# Patient Record
Sex: FEMALE | Race: WHITE | NOT HISPANIC OR LATINO | URBAN - METROPOLITAN AREA
[De-identification: names, ages, dates, MRNs, and addresses within clinical notes are randomized per-mention and may not be internally consistent; named-entity substitution may affect disease eponyms.]

---

## 2017-03-17 ENCOUNTER — COMMUNICATION - HEALTHEAST (OUTPATIENT)
Dept: SCHEDULING | Facility: CLINIC | Age: 47
End: 2017-03-17

## 2017-06-16 ENCOUNTER — OFFICE VISIT - HEALTHEAST (OUTPATIENT)
Dept: FAMILY MEDICINE | Facility: CLINIC | Age: 47
End: 2017-06-16

## 2017-07-27 ENCOUNTER — RECORDS - HEALTHEAST (OUTPATIENT)
Dept: ADMINISTRATIVE | Facility: OTHER | Age: 47
End: 2017-07-27

## 2018-01-10 ENCOUNTER — COMMUNICATION - HEALTHEAST (OUTPATIENT)
Dept: FAMILY MEDICINE | Facility: CLINIC | Age: 48
End: 2018-01-10

## 2018-01-10 DIAGNOSIS — Z12.31 SCREENING MAMMOGRAM, ENCOUNTER FOR: ICD-10-CM

## 2018-09-11 ENCOUNTER — HOSPITAL ENCOUNTER (OUTPATIENT)
Dept: MAMMOGRAPHY | Facility: CLINIC | Age: 48
Discharge: HOME | End: 2018-09-11
Attending: FAMILY MEDICINE

## 2018-09-11 DIAGNOSIS — Z12.31 SCREENING MAMMOGRAM, ENCOUNTER FOR: ICD-10-CM

## 2020-01-17 ENCOUNTER — HOSPITAL ENCOUNTER (OUTPATIENT)
Dept: MAMMOGRAPHY | Facility: CLINIC | Age: 50
Discharge: HOME | End: 2020-01-17
Attending: FAMILY MEDICINE

## 2020-01-17 DIAGNOSIS — Z12.31 VISIT FOR SCREENING MAMMOGRAM: ICD-10-CM

## 2020-02-18 ENCOUNTER — COMMUNICATION - HEALTHEAST (OUTPATIENT)
Dept: HEALTH INFORMATION MANAGEMENT | Facility: CLINIC | Age: 50
End: 2020-02-18

## 2020-03-04 ENCOUNTER — COMMUNICATION - HEALTHEAST (OUTPATIENT)
Dept: SCHEDULING | Facility: CLINIC | Age: 50
End: 2020-03-04

## 2020-03-04 ENCOUNTER — OFFICE VISIT - HEALTHEAST (OUTPATIENT)
Dept: FAMILY MEDICINE | Facility: CLINIC | Age: 50
End: 2020-03-04

## 2020-03-04 DIAGNOSIS — K59.00 CONSTIPATION, UNSPECIFIED CONSTIPATION TYPE: ICD-10-CM

## 2020-03-04 DIAGNOSIS — K62.5 RECTAL BLEEDING: ICD-10-CM

## 2020-03-04 LAB
ERYTHROCYTE [DISTWIDTH] IN BLOOD BY AUTOMATED COUNT: 10.9 % (ref 11–14.5)
HCT VFR BLD AUTO: 38.3 % (ref 35–47)
HGB BLD-MCNC: 13.3 G/DL (ref 12–16)
MCH RBC QN AUTO: 34.1 PG (ref 27–34)
MCHC RBC AUTO-ENTMCNC: 34.7 G/DL (ref 32–36)
MCV RBC AUTO: 98 FL (ref 80–100)
PLATELET # BLD AUTO: 288 THOU/UL (ref 140–440)
PMV BLD AUTO: 7.2 FL (ref 7–10)
RBC # BLD AUTO: 3.91 MILL/UL (ref 3.8–5.4)
T3FREE SERPL-MCNC: 3.1 PG/ML (ref 1.9–3.9)
T4 FREE SERPL-MCNC: 0.9 NG/DL (ref 0.7–1.8)
TSH SERPL DL<=0.005 MIU/L-ACNC: 1.18 UIU/ML (ref 0.3–5)
WBC: 5.5 THOU/UL (ref 4–11)

## 2020-03-05 ENCOUNTER — COMMUNICATION - HEALTHEAST (OUTPATIENT)
Dept: FAMILY MEDICINE | Facility: CLINIC | Age: 50
End: 2020-03-05

## 2020-03-12 ENCOUNTER — RECORDS - HEALTHEAST (OUTPATIENT)
Dept: ADMINISTRATIVE | Facility: OTHER | Age: 50
End: 2020-03-12

## 2021-04-06 ENCOUNTER — AMBULATORY - HEALTHEAST (OUTPATIENT)
Dept: NURSING | Facility: CLINIC | Age: 51
End: 2021-04-06

## 2021-04-27 ENCOUNTER — AMBULATORY - HEALTHEAST (OUTPATIENT)
Dept: NURSING | Facility: CLINIC | Age: 51
End: 2021-04-27

## 2021-05-31 VITALS — WEIGHT: 128 LBS | BODY MASS INDEX: 22.67 KG/M2

## 2021-06-04 VITALS — DIASTOLIC BLOOD PRESSURE: 80 MMHG | HEART RATE: 64 BPM | SYSTOLIC BLOOD PRESSURE: 122 MMHG

## 2021-06-06 NOTE — PROGRESS NOTES
"ASSESSMENT/PLAN:  Rectal bleeding  This is a 49-year-old female with a history of constipation and intermittent rectal bleeding who presented an episode of rectal bleeding with blood clot on the tissue paper noted this afternoon following a bowel movement.  Examination showed a fissure at the 12 o'clock position of the anal canal.  She has a family history of polyps.  I like her to get a colonoscopy.  We will do a CBC.  We spoke about treatment for constipation.  -     HM2(CBC w/o Differential)  -     Ambulatory referral for Colonoscopy    Constipation, unspecified constipation type  Patient describes going weeks without a bowel movement.  She describes hard stool.  She has had fissures and rectal bleeding as a result of constipation.  This is not new and has been chronic.  Will check a thyroid level.  Will recommend Trulance if she continues to be symptomatic.  -     T4, Free  -     T3 (Triiodothyronine), Free  -     Thyroid Stimulating Hormone (TSH)    Orders Placed This Encounter   Procedures     HM2(CBC w/o Differential)     T4, Free     T3 (Triiodothyronine), Free     Thyroid Stimulating Hormone (TSH)     Ambulatory referral for Colonoscopy     Referral Priority:   Routine     Referral Type:   Colonoscopy     Requested Specialty:   Gastroenterology     Number of Visits Requested:   1     CHIEF COMPLAINT:  Chief Complaint   Patient presents with     rectal bleeding     \" blood clot\" not regular BM, has had blood in the stool but never like one today     HISTORY OF PRESENT ILLNESS:  Oly is a 49 y.o. female presenting to the clinic today for abnormal rectal bleeding. She does not have normal bowel movements. She can go a week without having a bowel movement. She never feels pain or pressure from the constipation, but she has been bloated. Whenever she has a bowel movement, the stools are hard. She has had blood in her stool last week. She had pain with that bowel movement. She has had some soiling of her " "underwear with stool a few days ago. This afternoon she had a small \"blood clot\" on her toilet paper after having a bowel movement. The bowel movement was not painful. She saw some blood in the stool as well. She denies any heart burn, abdominal pain or upper GI symptoms. Her energy levels and menstrual cycles have been regular. She denies any rectal itchiness or bleeding outside of bowel movements. She has never had a colonoscopy. Her mother had cancer, but the patient is unsure what kind of cancer. Her mother also had colon polyps. There is no known family history of thyroid disease.     Degree of discomfort:  none  Bleeding:  Intermittent  Prolapse: none  Fecal soiling: none  Itchiness: none  Weight loss: none  Abdominal pain: none  Fever: none  Signs of anemia: none  H/o polyp: none  FMH of colon CA or inflammatory bowel disease: none    REVIEW OF SYSTEMS:   Constitutional: Negative.   HENT: Negative.   Eyes: Negative.   Respiratory: Negative.   Cardiovascular: Negative.   Gastrointestinal: Negative.   Endocrine: Negative.   Genitourinary: Negative.   Musculoskeletal: Negative.   Skin: Negative.   Allergic/Immunologic: Negative.   Neurological: Negative.   Hematological: Negative.   Psychiatric/Behavioral: Negative.   All other systems are negative.    TOBACCO USE:  Social History     Tobacco Use   Smoking Status Never Smoker   Smokeless Tobacco Never Used     VITALS:  Vitals:    03/04/20 1358   BP: 122/80   Patient Site: Left Arm   Patient Position: Sitting   Cuff Size: Adult Regular   Pulse: 64     Wt Readings from Last 3 Encounters:   06/16/17 128 lb (58.1 kg)   10/13/15 122 lb 9.6 oz (55.6 kg)   10/04/15 120 lb (54.4 kg)       PHYSICAL EXAM:  Constitutional: Patient is oriented to person, place, and time. Patient appears well-developed and well-nourished. No distress.   Head: Normocephalic and atraumatic.   Right Ear: External ear normal.   Left Ear: External ear normal.   Nose: Nose normal.   Rectal exam: " There is a fissure at the 12 o'clock position of the anal canal. It is not actively bleeding, but it is open. Good anal sphincter tone.  There is no palpable mass. Stool on examining finger.  No blood on examining finger.   Neurological: Patient is alert and oriented to person, place, and time. Patient has normal reflexes. No cranial nerve deficit. Coordination normal.   Skin: Skin is warm and dry. No rash noted. Patient is not diaphoretic. No erythema. No pallor.    ADDITIONAL HISTORY SUMMARIZED (2): Reviewed RN triage note from 03/04/2020 about rectal bleeding.   DECISION TO OBTAIN EXTRA INFORMATION (1): None.   RADIOLOGY TESTS (1): None.  LABS (1): Reviewed labs from 09/29/15 and ordered labs today.   MEDICINE TESTS (1): None.  INDEPENDENT REVIEW (2 each): None.     Phuong YOUSIF, am scribing for and in the presence of, Dr. Brock.    IDr. Brock, personally performed the services described in this documentation, as scribed by Phuong Hayden in my presence, and it is both accurate and complete.    MEDICATIONS:  Current Outpatient Medications   Medication Sig Dispense Refill     BIOTIN ORAL Take by mouth.       meloxicam (MOBIC) 15 MG tablet Take 0.5-1 tablets (7.5-15 mg total) by mouth daily as needed for pain. 30 tablet 2     MULTIVITAMIN ORAL Take by mouth.       oxyCODONE-acetaminophen (ROXICET) 5-325 mg per tablet Take 1 tablet by mouth every 4 (four) hours as needed for pain. 20 tablet 0     No current facility-administered medications for this visit.        Total data points:3

## 2021-06-06 NOTE — TELEPHONE ENCOUNTER
Test Results  Who is calling?:  Patient  Who ordered the test:  Hanna Farah MD   Type of test: Lab  Date of test:  3/4/2020  Where was the test performed:  [facility]  What are your questions/concerns?:  Patient stated that she has concerns about the result below. Patient stated that she would like a call back.  Okay to leave a detailed message?:  Yes  [phone]         Copied and pasted from Lab result 3/4/2020:  Contains abnormal data HM2(CBC w/o Differential)   Order: [id number]   Status:  Final result   Visible to patient:  Yes (MyChart)   Next appt:  None   Dx:  Rectal bleeding     Ref Range & Units 3/4/20 1428    WBC 4.0 - 11.0 thou/uL 5.5     RBC 3.80 - 5.40 mill/uL 3.91     Hemoglobin 12.0 - 16.0 g/dL 13.3     Hematocrit 35.0 - 47.0 % 38.3     MCV 80 - 100 fL 98     MCH 27.0 - 34.0 pg 34.1High      MCHC 32.0 - 36.0 g/dL 34.7     RDW 11.0 - 14.5 % 10.9Low      Platelets 140 - 440 thou/uL 288     MPV 7.0 - 10.0 fL 7.2

## 2021-06-10 ENCOUNTER — HOSPITAL ENCOUNTER (OUTPATIENT)
Dept: MAMMOGRAPHY | Facility: CLINIC | Age: 51
Discharge: HOME | End: 2021-06-10
Attending: FAMILY MEDICINE

## 2021-06-10 DIAGNOSIS — Z12.31 VISIT FOR SCREENING MAMMOGRAM: ICD-10-CM

## 2021-06-11 NOTE — PROGRESS NOTES
Assessment/Plan:        Diagnoses and all orders for this visit:    Lump    Other orders  -     cephalexin (KEFLEX) 500 MG capsule; Take 1 capsule (500 mg total) by mouth 3 (three) times a day for 7 days.  Dispense: 21 capsule; Refill: 0        Likely related to infected cyst.  Avoid picking in the area.  Easily infected because of the location.  We will start her on Keflex.  Increase yogurt or probiotics with the medication.  Warm compress in the area.  Lesion on her right temporal region has appearance of SK.  She has this monitored with dermatology and will have a follow-up on June 30.  She was agreeable with the plans.  Subjective:    Patient ID: Oly Ch is a 46 y.o. female.    HPI    Oly is here with concerns about the lump on her left inguinal region.  Noticed it almost 2 months ago.  Looked like a pimple or ingrown hair, skin colored.  Minimal discomfort.  It went away on its own.  Came back again after couple of weeks and has been persistent.  Seems to be more elevated at this time.  Denies any drainage, bleeding.  Would be uncomfortable when she tries to pick on it.  Denies any exposure to anyone with similar lesions.  No changes in her skin products.  No shaving.  No fever, chills, nausea, vomiting with it.      She also has a follow-up with her dermatologist.  She has a spot in her temporal region which has been there for a while.  Denies any changes at this time.  No pain.  Occasionally itchy.    Review of Systems  As above otherwise negative.          Objective:    Physical Exam  /70 (Patient Site: Left Arm, Patient Position: Sitting, Cuff Size: Adult Regular)  Pulse (!) 53  Wt 128 lb (58.1 kg)  LMP 06/16/2017 (Approximate)  SpO2 100%  Breastfeeding? No  BMI 22.67 kg/m2    Vital signs noted above. AAO ×3.  HEENT no nasal discharge, moist oral mucosa. Neck: Supple neck, nonpalpable cervical lymph nodes.  Lungs: Clear to auscultation bilateral.  Heart: S1-S2 regular rate and  rhythm, no murmurs were noted.  Abdomen:  with bowel sounds.  Skin: Erythematous lump on her left inguinal region, slightly firm, no active discharge or bleeding.  Base was erythematous.  The lump was at 1 x 1.5 cm.  Hyperpigmented patch on her right temporal scalp, slightly rough, 4 x 3 cm.

## 2021-06-20 NOTE — LETTER
Letter by Rina Sauceda at      Author: Rina Sauceda Service: -- Author Type: --    Filed:  Encounter Date: 2/18/2020 Status: (Other)         February 18, 2020       Oly Ch  [address] [place] [address]    Dear Oly Ch:    We are pleased to provide you with secure, online access to medical information for you and your family within [facility]. Per your request, we have expanded your account to allow access to the records of the following family members:              Concepcion ESTRADA Vanceakil (privilege ends on 2/18/2021.)     How Do I Log In?  1. In your Internet browser, go to [email or web address]  2. Log into [employer] using your [employer] Username and Password.  3. Click Sign In.        How Do I Access a Family Member's Account?  4. Select the account you want to access by clicking the [place] with the appropriate patient's name at the top of your screen.   5. You will see a disclaimer page letting you know that you will be viewing a family member's record. Review the disclaimer and then click Accept Proxy Access Disclaimer to proceed.  6. Once you switch to viewing a family member's record, you can navigate to [employer] pages the same way you would for yourself. You can return to your own account by clicking the [place] at the top of the screen with your name on it.    7. To customize the colors and names of the linked accounts, you can select Personalize from the Profile dropdown menu at the top of the screen, then click the Edit button to make changes.     Additional Information  If you have questions, visit [email or web address], e-mail [email or web address] or call [phone] to talk to our [employer] staff. Remember, [employer] is NOT to be used for urgent needs. For medical emergencies, dial 911.

## 2021-06-26 ENCOUNTER — HEALTH MAINTENANCE LETTER (OUTPATIENT)
Age: 51
End: 2021-06-26

## 2021-10-16 ENCOUNTER — HEALTH MAINTENANCE LETTER (OUTPATIENT)
Age: 51
End: 2021-10-16

## 2022-05-05 NOTE — TELEPHONE ENCOUNTER
Erin Nagel Patient Age: 33 year old  MESSAGE: Interpreting service used: No      Obstetrics & Gynecology- Reason for call: returning call       PATIENT STATES SHE IS RETURNING CALL        Routed to providers clinical pool.         ALLERGIES:  Sulfa antibiotics  Current Outpatient Medications   Medication   • metroNIDAZOLE (METROGEL VAGINAL) 0.75 % vaginal gel   • escitalopram (LEXAPRO) 10 MG tablet   • ALPRAZolam (XANAX) 0.5 MG tablet   • Prenatal Vit-Fe Fumarate-FA (MULTIVITAMIN & MINERAL W/FOLIC ACID- PRENATAL) 27-1 MG Tab     No current facility-administered medications for this visit.     PHARMACY to use:           Pharmacy preference(s) on file:   [id number] [place] - [address]  [address] [address]  Phone: [phone] Fax: [phone]    [id number] - [place] - [address]  [address] [facility] [address]  [address] [place] [address]  Phone: [phone] Fax: [phone]      CALL BACK INFO: Ok to leave response (including medical information) with family member or on answering machine      PCP: Luli Mendoza DO         INS: Payor: [employer] / Plan: RESHMA ARCINIEGA BA [id number] / Product Type: [employer]   PATIENT ADDRESS:  [address] [place] [address] [place] [address]     RN Triage:    History of infrequent BM's.    Just passed a small blood clot with a formed BM.  Streak of blood on toilet paper.  Has happened infrequently before.  No ABD pain.  Appointment made in clinic per guideline.    Kourtney French, RN  [facility]      Reason for Disposition    Normal formed BM with a few streaks or drops of blood on surface of BM    Protocols used: RECTAL BLEEDING-A-OH

## 2022-05-17 ENCOUNTER — NURSE TRIAGE (OUTPATIENT)
Dept: NURSING | Facility: CLINIC | Age: 52
End: 2022-05-17

## 2022-05-17 NOTE — TELEPHONE ENCOUNTER
Nurse Triage SBAR    Is this a 2nd Level Triage? NO    Situation: Patient calling.  Consent: not needed    Background: Covid +     Assessment:   * Covid positive test today with sx beginning on Saturday/Sunday  * Pt believes she contracted Covid from her daughter who became symptomatic on Friday with similar sx to pt.    * Pt states worse sx:  Body aches.   * Cough:  Pt states she coughs multiples times per hour.  Least frequent symptoms.   * Denies SOB at this time.   * Feels very tired, sleeping more than normal the past day.    * Slight congestion  * Pt has not taken temp at this time but reports feeling chilled and warm intermittently.   * Pt has had both vaccines + 1 booster.   * Pt also reports slight sore throat (scratchy) denies pain but has body aches.    * Treating body aches with ibuprofen.  Does not have pulse oximeter.   Pt had questions about quarantine, exposure and quarantine of other family members (all are fully vaccinated boostered).     Protocol Recommended Disposition:   Home care    Maribell Thomas RN [facility] 5/17/2022 3:39 PM      COVID 19 Nurse Triage Plan/Patient Instructions    Please be aware that novel coronavirus (COVID-19) may be circulating in the community. If you develop symptoms such as fever, cough, or SOB or if you have concerns about the presence of another infection including coronavirus (COVID-19), please contact your health care provider or visit [email or web address].     Disposition/Instructions    Home care recommended. Follow home care protocol based instructions.    Thank you for taking steps to prevent the spread of this virus.  o Limit your contact with others.  o Wear a simple mask to cover your cough.  o Wash your hands well and often.    Resources    [facility]: About COVID-19: [email or web address]    CDC: What to Do If You're Sick: www.cdc.gov/coronavirus/2019-ncov/about/steps-when-sick.html    CDC: Ending Home  Isolation: www.cdc.gov/coronavirus/2019-ncov/hcp/disposition-in-home-patients.html     CDC: Caring for Someone: www.cdc.gov/coronavirus/2019-ncov/if-you-are-sick/care-for-someone.html     [facility]: Interim Guidance for Hospital Discharge to Home: www.health.[facility].[place]/diseases/coronavirus/hcp/hospdischarge.pdf    [facility] clinical trials (COVID-19 research studies): clinicalaffairs.[facility]-clinical-trials     Below are the COVID-19 hotlines at the [place] Department of Health ([facility]). Interpreters are available.   o For health questions: Call 472-146-0763 or 1-215.275.7581 (7 a.m. to 7 p.m.)  o For questions about schools and childcare: Call 856-547-3642 or 1-953.811.2876 (7 a.m. to 7 p.m.)                         Reason for Disposition    [1] COVID-19 diagnosed by positive lab test (e.g., PCR, rapid self-test kit) AND [2] mild symptoms (e.g., cough, fever, others) AND [3] no complications or SOB    Additional Information    Negative: SEVERE difficulty breathing (e.g., struggling for each breath, speaks in single words)    Negative: Difficult to awaken or acting confused (e.g., disoriented, slurred speech)    Negative: Bluish (or gray) lips or face now    Negative: Shock suspected (e.g., cold/pale/clammy skin, too weak to stand, low BP, rapid pulse)    Negative: Sounds like a life-threatening emergency to the triager    Negative: [1] Diagnosed or suspected COVID-19 AND [2] symptoms lasting 3 or more weeks    Negative: [1] COVID-19 exposure AND [2] no symptoms    Negative: COVID-19 vaccine reaction suspected (e.g., fever, headache, muscle aches) occurring 1 to 3 days after getting vaccine    Negative: COVID-19 vaccine, questions about    Negative: [1] Lives with someone known to have influenza (flu test positive) AND [2] flu-like symptoms (e.g., cough, runny nose, sore throat, SOB; with or without fever)    Negative: [1] Adult with possible COVID-19 symptoms AND [2] triager concerned about severity of  symptoms or other causes    Negative: COVID-19 and breastfeeding, questions about    Negative: SEVERE or constant chest pain or pressure  (Exception: Mild central chest pain, present only when coughing.)    Negative: MODERATE difficulty breathing (e.g., speaks in phrases, SOB even at rest, pulse 100-120)    Negative: Oxygen level (e.g., pulse oximetry) 90 percent or lower    Negative: Headache and stiff neck (can't touch chin to chest)    Negative: Chest pain or pressure    Negative: Patient sounds very sick or weak to the triager    Negative: MILD difficulty breathing (e.g., minimal/no SOB at rest, SOB with walking, pulse <100)    Negative: Fever > 103 F (39.4 C)    Negative: [1] Fever > 101 F (38.3 C) AND [2] over 60 years of age    Negative: [1] Fever > 100.0 F (37.8 C) AND [2] bedridden (e.g., nursing home patient, CVA, chronic illness, recovering from surgery)    Negative: HIGH RISK for severe COVID complications (e.g., weak immune system, age > 64 years, obesity with BMI > 25, pregnant, chronic lung disease or other chronic medical condition) (Exception: Already seen by PCP and no new or worsening symptoms.)    Negative: [1] HIGH RISK patient AND [2] influenza is widespread in the community AND [3] ONE OR MORE respiratory symptoms: cough, sore throat, runny or stuffy nose    Negative: [1] HIGH RISK patient AND [2] influenza exposure within the last 7 days AND [3] ONE OR MORE respiratory symptoms: cough, sore throat, runny or stuffy nose    Negative: Oxygen level (e.g., pulse oximetry) 91 to 94 percent    Negative: [1] COVID-19 infection suspected by caller or triager AND [2] mild symptoms (cough, fever, or others) AND [3] negative COVID-19 rapid test    Negative: Fever present > 3 days (72 hours)    Negative: [1] Fever returns after gone for over 24 hours AND [2] symptoms worse or not improved    Negative: [1] Continuous (nonstop) coughing interferes with work or school AND [2] no improvement using cough  treatment per Care Advice    Negative: Cough present > 3 weeks    Protocols used: CORONAVIRUS (COVID-19) DIAGNOSED OR [id number] 1.18.2022

## 2022-07-17 ENCOUNTER — HEALTH MAINTENANCE LETTER (OUTPATIENT)
Age: 52
End: 2022-07-17

## 2022-09-25 ENCOUNTER — HEALTH MAINTENANCE LETTER (OUTPATIENT)
Age: 52
End: 2022-09-25

## 2022-10-05 ENCOUNTER — HOSPITAL ENCOUNTER (OUTPATIENT)
Dept: MAMMOGRAPHY | Facility: CLINIC | Age: 52
Discharge: HOME | End: 2022-10-05
Attending: FAMILY MEDICINE | Admitting: FAMILY MEDICINE
Payer: COMMERCIAL

## 2022-10-05 DIAGNOSIS — Z12.31 VISIT FOR SCREENING MAMMOGRAM: ICD-10-CM

## 2022-10-05 PROCEDURE — 77067 SCR MAMMO BI INCL CAD: CPT

## 2022-11-09 ENCOUNTER — TELEPHONE (OUTPATIENT)
Dept: FAMILY MEDICINE | Facility: CLINIC | Age: 52
End: 2022-11-09

## 2022-11-09 NOTE — TELEPHONE ENCOUNTER
Reason for Call:  Other appointment    Detailed comments: Patient's pcp (Dr. Delmy Cao) is leaving and no apts available.  She would prefer an apt to discuss genetic testing as a priority instead of her annual wellness exam.  Nothing on the schedule for her for a new provider.  Please let the patient know which other available provider is available in lie of her pcp that will be leaving.      Phone Number Patient can be reached at: [phone]    Best Time:      Can we leave a detailed message on this number? YES    Call taken on 11/9/2022 at 10:15 AM by Phuong Neal

## 2022-11-09 NOTE — TELEPHONE ENCOUNTER
She no-show her appt this morning.    As for a new healthcare provider, I was told that the following [facility] clinicians are accepting patients:   ANALI Ibarra NP Cara Simone, PA

## 2022-11-10 NOTE — TELEPHONE ENCOUNTER
Called and spoke to patient; she called to cancel her appointment yesterday, but the  did not schedule her she was helping patient reschedule the appointment to establish care and was unsuccessful. Patient is now scheduled with Maddy Carlos NP on 12/2/22 and is aware that this provider will be coming to [facility] the first of the year 2023.

## 2022-12-02 ENCOUNTER — OFFICE VISIT (OUTPATIENT)
Dept: FAMILY MEDICINE | Facility: CLINIC | Age: 52
End: 2022-12-02
Payer: COMMERCIAL

## 2022-12-02 VITALS
HEIGHT: 63 IN | SYSTOLIC BLOOD PRESSURE: 133 MMHG | BODY MASS INDEX: 25.16 KG/M2 | OXYGEN SATURATION: 97 % | RESPIRATION RATE: 12 BRPM | DIASTOLIC BLOOD PRESSURE: 86 MMHG | TEMPERATURE: 98.2 F | WEIGHT: 142 LBS | HEART RATE: 71 BPM

## 2022-12-02 DIAGNOSIS — Z80.3 FAMILY HISTORY OF MALIGNANT NEOPLASM OF BREAST: Primary | ICD-10-CM

## 2022-12-02 DIAGNOSIS — Z11.59 NEED FOR HEPATITIS C SCREENING TEST: ICD-10-CM

## 2022-12-02 DIAGNOSIS — Z13.220 SCREENING FOR HYPERLIPIDEMIA: ICD-10-CM

## 2022-12-02 DIAGNOSIS — Z11.4 SCREENING FOR HIV (HUMAN IMMUNODEFICIENCY VIRUS): ICD-10-CM

## 2022-12-02 DIAGNOSIS — Z80.49 FAMILY HISTORY OF CERVICAL CANCER: ICD-10-CM

## 2022-12-02 DIAGNOSIS — Z12.4 CERVICAL CANCER SCREENING: ICD-10-CM

## 2022-12-02 DIAGNOSIS — Z80.0 FAMILY HISTORY OF COLON CANCER: ICD-10-CM

## 2022-12-02 PROCEDURE — 99396 PREV VISIT EST AGE 40-64: CPT | Performed by: NURSE PRACTITIONER

## 2022-12-02 PROCEDURE — 87624 HPV HI-RISK TYP POOLED RSLT: CPT | Performed by: NURSE PRACTITIONER

## 2022-12-02 PROCEDURE — G0145 SCR C/V CYTO,THINLAYER,RESCR: HCPCS | Performed by: NURSE PRACTITIONER

## 2022-12-02 NOTE — PROGRESS NOTES
SUBJECTIVE:   CC: Oly is an 52 year old who presents for preventive health visit. She would like to get genetic testing done. Has cervical, breast, and colon cancer in her family. Denied any abnormal symptoms, does not smoke or abuse Alcohol. She does not get regular exercise. Tries to eat healthy. Has four kids and stays at home.    Patient has been advised of split billing requirements and indicates understanding: Yes  HPI    Today's PHQ-2 Score:   PHQ-2 ( 1999 [employer]) 12/2/2022   Q1: Little interest or pleasure in doing things 0   Q2: Feeling down, depressed or hopeless 0   PHQ-2 Score 0   Q1: Little interest or pleasure in doing things Not at all   Q2: Feeling down, depressed or hopeless Not at all   PHQ-2 Score 0       Have you ever done Advance Care Planning? (For example, a Health Directive, POLST, or a discussion with a medical provider or your loved ones about your wishes): No, advance care planning information given to patient to review.  Patient declined advance care planning discussion at this time.    Social History     Tobacco Use     Smoking status: Never     Smokeless tobacco: Never   Substance Use Topics     Alcohol use: No     If you drink alcohol do you typically have >3 drinks per day or >7 drinks per week? Not applicable        Reviewed orders with patient.  Reviewed health maintenance and updated orders accordingly - Yes  Labs reviewed in EPIC    Breast Cancer Screening:  Any new diagnosis of family breast, ovarian, or bowel cancer? Yes       FHS-7:   Breast CA Risk Assessment (FHS-7) 10/5/2022 12/2/2022   Did any of your first-degree relatives have breast or ovarian cancer? Yes Yes   Did any of your relatives have bilateral breast cancer? No Unknown   Did any man in your family have breast cancer? No No   Did any woman in your family have breast and ovarian cancer? No No   Did any woman in your family have breast cancer before age 50 y? No Unknown   Do you have 2 or more relatives with  "breast and/or ovarian cancer? No Yes   Do you have 2 or more relatives with breast and/or bowel cancer? Yes Yes       Mammogram Screening: Recommended annual mammography  Pertinent mammograms are reviewed under the imaging tab.    History of abnormal Pap smear: NO - age 30-65 PAP every 5 years with negative HPV co-testing recommended  PAP / HPV 9/29/2015   PAP Negative for squamous intraepithelial lesion or malignancy  Electronically signed by Luanne Zhao CT (ASCP) on 10/12/2015 at 12:33 PM       Reviewed and updated as needed this visit by clinical staff   Tobacco  Allergies   Problems  Med Hx  Surg Hx  Fam Hx          Reviewed and updated as needed this visit by Provider   Tobacco    Problems  Med Hx  Surg Hx  Fam Hx         History reviewed. No pertinent past medical history.   Past Surgical History:   Procedure Laterality Date     BUNIONECTOMY         Review of Systems  CONSTITUTIONAL: NEGATIVE for fever, chills, change in weight  INTEGUMENTARU/SKIN: NEGATIVE for worrisome rashes, moles or lesions  EYES: NEGATIVE for vision changes or irritation  ENT: NEGATIVE for ear, mouth and throat problems  RESP: NEGATIVE for significant cough or SOB  BREAST: NEGATIVE for masses, tenderness or discharge  CV: NEGATIVE for chest pain, palpitations or peripheral edema  GI: NEGATIVE for nausea, abdominal pain, heartburn, or change in bowel habits  : NEGATIVE for unusual urinary or vaginal symptoms. Periods are regular.  MUSCULOSKELETAL: NEGATIVE for significant arthralgias or myalgia  NEURO: NEGATIVE for weakness, dizziness or paresthesias  PSYCHIATRIC: NEGATIVE for changes in mood or affect     OBJECTIVE:   /86   Pulse 71   Temp 98.2  F (36.8  C) (Oral)   Resp 12   Ht 1.6 m (5' 3\")   Wt 64.4 kg (142 lb)   LMP 11/02/2022   SpO2 97%   BMI 25.15 kg/m    Physical Exam  GENERAL: healthy, alert and no distress  EYES: Eyes grossly normal to inspection, PERRL and conjunctivae and sclerae " "normal  HENT: ear canals and TM's normal, nose and mouth without ulcers or lesions  NECK: no adenopathy, no asymmetry, masses, or scars and thyroid normal to palpation  RESP: lungs clear to auscultation - no rales, rhonchi or wheezes  CV: regular rate and rhythm, normal S1 S2, no S3 or S4, no murmur, click or rub, no peripheral edema and peripheral pulses strong  ABDOMEN: soft, nontender, no hepatosplenomegaly, no masses and bowel sounds normal  MS: no gross musculoskeletal defects noted, no edema  SKIN: no suspicious lesions or rashes  NEURO: Normal strength and tone, mentation intact and speech normal  PSYCH: mentation appears normal, affect normal/bright    Diagnostic Test Results:  Labs reviewed in Epic    ASSESSMENT/PLAN:       ICD-10-CM    1. Family history of malignant neoplasm of breast  Z80.3 Cancer Risk Mgmt/Cancer Genetic Counseling Referral      2. Screening for HIV (human immunodeficiency virus)  Z11.4 HIV Antigen Antibody Combo      3. Need for hepatitis C screening test  Z11.59 Hepatitis C Screen Reflex to HCV RNA Quant and Genotype      4. Cervical cancer screening  Z12.4 Pap Screen with HPV - recommended age 30 - 65 years      5. Screening for hyperlipidemia  Z13.220 Lipid panel reflex to direct LDL Non-fasting      6. Family history of colon cancer  Z80.0 Cancer Risk Mgmt/Cancer Genetic Counseling Referral      7. Family history of cervical cancer  Z80.49 Cancer Risk Mgmt/Cancer Genetic Counseling Referral        - preventive measures discussed today.     Patient has been advised of split billing requirements and indicates understanding: Yes      COUNSELING:  Reviewed preventive health counseling, as reflected in patient instructions      BMI:   Estimated body mass index is 25.15 kg/m  as calculated from the following:    Height as of this encounter: 1.6 m (5' 3\").    Weight as of this encounter: 64.4 kg (142 lb).         She reports that she has never smoked. She has never used smokeless " tobacco.          FLO Lazo CNP  [facility]

## 2022-12-07 LAB
BKR LAB AP GYN ADEQUACY: NORMAL
BKR LAB AP GYN INTERPRETATION: NORMAL
BKR LAB AP HPV REFLEX: NORMAL
BKR LAB AP LMP: NORMAL
BKR LAB AP PREVIOUS ABNORMAL: NORMAL
PATH REPORT.COMMENTS IMP SPEC: NORMAL
PATH REPORT.COMMENTS IMP SPEC: NORMAL
PATH REPORT.RELEVANT HX SPEC: NORMAL

## 2022-12-09 LAB
HUMAN PAPILLOMA VIRUS 16 DNA: NEGATIVE
HUMAN PAPILLOMA VIRUS 18 DNA: NEGATIVE
HUMAN PAPILLOMA VIRUS FINAL DIAGNOSIS: NORMAL
HUMAN PAPILLOMA VIRUS OTHER HR: NEGATIVE

## 2023-09-05 ENCOUNTER — PATIENT OUTREACH (OUTPATIENT)
Dept: CARE COORDINATION | Facility: CLINIC | Age: 53
End: 2023-09-05
Payer: COMMERCIAL

## 2023-09-07 ENCOUNTER — NURSE TRIAGE (OUTPATIENT)
Dept: FAMILY MEDICINE | Facility: CLINIC | Age: 53
End: 2023-09-07
Payer: COMMERCIAL

## 2023-09-07 NOTE — TELEPHONE ENCOUNTER
Nurse Triage SBAR    Is this a 2nd Level Triage? YES, LICENSED PRACTITIONER REVIEW IS REQUIRED    Situation:  Situational dizziness mostly with head position    Appt scheduled  9/8/2023   10:00 AM (Arrive by 9:40 AM)  Dorothy Bobo PA-C       Background:   Assessment:   1. DESCRIPTION:  Room moves, feels like it is positional of her head  2. LIGHTHEADED:  Woozy onetime but not light headed   3. VERTIGO:  Yes, room rocking, does not feel like she will fall, when she lays down or rolls over, lasts just a moment or 2.   4. SEVERITY: - MILD: Feels slightly dizzy, but walking normally.  5. ONSET:   Last 2 months everyday  6. AGGRAVATING FACTORS:   Head position especially looking up, but not always  7. HEART RATE:   8. CAUSE:  NA  9. RECURRENT SYMPTOM: Yes regularly over last 2 months  10. OTHER SYMPTOMS: vision has changed over last 1-2 weeks  11. PREGNANCY: NA      Protocol Recommended Disposition:   See in Office Today    Recommendation:      See in office    Routed to provider    Does the patient meet one of the following criteria for ADS visit consideration? No       Reason for Disposition   Patient wants to be seen    Additional Information   Negative: Fainted, and still feels dizzy afterwards   Negative: SEVERE difficulty breathing (e.g., struggling for each breath, speaks in single words)   Negative: Shock suspected (e.g., cold/pale/clammy skin, too weak to stand, low BP, rapid pulse)   Negative: Difficult to awaken or acting confused (e.g., disoriented, slurred speech)   Negative: Overdose (accidental or intentional) of medications   Negative: New neurologic deficit that is present now: * Weakness of the face, arm, or leg on one side of the body * Numbness of the face, arm, or leg on one side of the body * Loss of speech or garbled speech   Negative: Heart beating < 50 beats per minute OR > 140 beats per minute   Negative: Sounds like a life-threatening emergency to the triager   Negative: Chest pain    Negative: Rectal bleeding, bloody stool, or tarry-black stool   Negative: Vomiting is main symptom   Negative: Diarrhea is main symptom   Negative: Headache is main symptom   Negative: Heat exhaustion suspected (i.e., dehydration from heat exposure)   Negative: Patient states that they are having an anxiety or panic attack   Negative: Dizziness from low blood sugar (i.e., < 60 mg/dl or 3.5 mmol/l)   Negative: Lightheadedness (dizziness) present now, after 2 hours of rest and fluids   Negative: Spinning or tilting sensation (vertigo) present now   Negative: Fever > 103 F (39.4 C)   Negative: Fever > 100.0 F (37.8 C) and has diabetes mellitus or a weak immune system (e.g., HIV positive, cancer chemotherapy, organ transplant, splenectomy, chronic steroids)   Negative: SEVERE dizziness (e.g., unable to stand, requires support to walk, feels like passing out now)   Negative: SEVERE headache or neck pain   Negative: Spinning or tilting sensation (vertigo) present now and one or more stroke risk factors (i.e., hypertension, diabetes mellitus, prior stroke/TIA, heart attack, age over 60) (Exception: Prior physician evaluation for this AND no different/worse than usual.)   Negative: Neurologic deficit that was brief (now gone), ANY of the following:* Weakness of the face, arm, or leg on one side of the body* Numbness of the face, arm, or leg on one side of the body* Loss of speech or garbled speech   Negative: Loss of vision or double vision  (Exception: Similar to previous migraines.)   Negative: Extra heartbeats, irregular heart beating, or heart is beating very fast (i.e., 'palpitations')   Negative: Difficulty breathing   Negative: Drinking very little and dehydration suspected (e.g., no urine > 12 hours, very dry mouth, very lightheaded)   Negative: Follows bleeding (e.g., stomach, rectum, vagina)  (Exception: Became dizzy from sight of small amount blood.)   Negative: Patient sounds very sick or weak to the triager    Negative: MODERATE dizziness (e.g., interferes with normal activities)  (Exception: Dizziness caused by heat exposure, sudden standing, or poor fluid intake.)   Negative: Vomiting occurs with dizziness    Protocols used: Dizziness-A-ALDA Camacho RN  [facility]

## 2023-09-08 ENCOUNTER — OFFICE VISIT (OUTPATIENT)
Dept: FAMILY MEDICINE | Facility: CLINIC | Age: 53
End: 2023-09-08
Payer: COMMERCIAL

## 2023-09-08 VITALS
OXYGEN SATURATION: 94 % | DIASTOLIC BLOOD PRESSURE: 72 MMHG | BODY MASS INDEX: 23.04 KG/M2 | TEMPERATURE: 97.8 F | HEART RATE: 66 BPM | HEIGHT: 63 IN | SYSTOLIC BLOOD PRESSURE: 120 MMHG | RESPIRATION RATE: 19 BRPM | WEIGHT: 130 LBS

## 2023-09-08 DIAGNOSIS — R42 VERTIGO: Primary | ICD-10-CM

## 2023-09-08 LAB
ALBUMIN SERPL BCG-MCNC: 4.4 G/DL (ref 3.5–5.2)
ALP SERPL-CCNC: 49 U/L (ref 35–104)
ALT SERPL W P-5'-P-CCNC: 16 U/L (ref 0–50)
ANION GAP SERPL CALCULATED.3IONS-SCNC: 10 MMOL/L (ref 7–15)
AST SERPL W P-5'-P-CCNC: 24 U/L (ref 0–45)
BILIRUB SERPL-MCNC: 0.3 MG/DL
BUN SERPL-MCNC: 13.1 MG/DL (ref 6–20)
CALCIUM SERPL-MCNC: 9.4 MG/DL (ref 8.6–10)
CHLORIDE SERPL-SCNC: 105 MMOL/L (ref 98–107)
CREAT SERPL-MCNC: 0.57 MG/DL (ref 0.51–0.95)
DEPRECATED HCO3 PLAS-SCNC: 23 MMOL/L (ref 22–29)
EGFRCR SERPLBLD CKD-EPI 2021: >90 ML/MIN/1.73M2
ERYTHROCYTE [DISTWIDTH] IN BLOOD BY AUTOMATED COUNT: 12.2 % (ref 10–15)
GLUCOSE SERPL-MCNC: 90 MG/DL (ref 70–99)
HCT VFR BLD AUTO: 37.6 % (ref 35–47)
HGB BLD-MCNC: 13.4 G/DL (ref 11.7–15.7)
MCH RBC QN AUTO: 33.6 PG (ref 26.5–33)
MCHC RBC AUTO-ENTMCNC: 35.6 G/DL (ref 31.5–36.5)
MCV RBC AUTO: 94 FL (ref 78–100)
PLATELET # BLD AUTO: 324 10E3/UL (ref 150–450)
POTASSIUM SERPL-SCNC: 4.2 MMOL/L (ref 3.4–5.3)
PROT SERPL-MCNC: 7.1 G/DL (ref 6.4–8.3)
RBC # BLD AUTO: 3.99 10E6/UL (ref 3.8–5.2)
SODIUM SERPL-SCNC: 138 MMOL/L (ref 136–145)
TSH SERPL DL<=0.005 MIU/L-ACNC: 1.46 UIU/ML (ref 0.3–4.2)
WBC # BLD AUTO: 6.6 10E3/UL (ref 4–11)

## 2023-09-08 PROCEDURE — 80053 COMPREHEN METABOLIC PANEL: CPT | Performed by: PHYSICIAN ASSISTANT

## 2023-09-08 PROCEDURE — 99213 OFFICE O/P EST LOW 20 MIN: CPT | Performed by: PHYSICIAN ASSISTANT

## 2023-09-08 PROCEDURE — 36415 COLL VENOUS BLD VENIPUNCTURE: CPT | Performed by: PHYSICIAN ASSISTANT

## 2023-09-08 PROCEDURE — 85027 COMPLETE CBC AUTOMATED: CPT | Performed by: PHYSICIAN ASSISTANT

## 2023-09-08 PROCEDURE — 84443 ASSAY THYROID STIM HORMONE: CPT | Performed by: PHYSICIAN ASSISTANT

## 2023-09-08 RX ORDER — MECLIZINE HYDROCHLORIDE 25 MG/1
25 TABLET ORAL 3 TIMES DAILY PRN
Qty: 15 TABLET | Refills: 0 | Status: SHIPPED | OUTPATIENT
Start: 2023-09-08

## 2023-09-08 NOTE — PROGRESS NOTES
"  Assessment & Plan     Vertigo  Unclear etiology, [facility] and has follow up with ophthalmology    mri if no improvement  - CBC with platelets  - Comprehensive metabolic panel (BMP + Alb, Alk Phos, ALT, AST, Total. Bili, TP)  - TSH with free T4 reflex  - Physical Therapy Referral  - meclizine (ANTIVERT) 25 MG tablet  Dispense: 15 tablet; Refill: 0  - CBC with platelets  - Comprehensive metabolic panel (BMP + Alb, Alk Phos, ALT, AST, Total. Bili, TP)  - TSH with free T4 reflex         Follow up 2 weeks if no improvement, Ed with any new or worsening symptoms    Dorothy Bobo PA-C  [facility] REUBEN Aldridge is a 53 year old, presenting for the following health issues:  Dizziness        9/8/2023     9:57 AM   Additional Questions   Roomed by Cristal BARRIENTOS       History of Present Illness       Reason for visit:  Dizzy spells  Symptom onset:  More than a month    She eats 2-3 servings of fruits and vegetables daily.She consumes 1 sweetened beverage(s) daily.She exercises with enough effort to increase her heart rate 10 to 19 minutes per day.  She exercises with enough effort to increase her heart rate 3 or less days per week.   She is taking medications regularly.       Dizzy spells x 2 months.  Notes the room spins when she lays down in bed or if she tilts her head up to look up.  Episodes last about a minute at a time and occur 5-6 times a day.  Has not tried any otc medication for this.  Denies changes in diet or water consumption, denies headaches, has noticed her vision in her right eye has worsened and has an ophthalmology appt.  In 3 days to check vision.  Denies weakness, nausea or vomiting.        Review of Systems   Constitutional, HEENT, cardiovascular, pulmonary, gi and gu systems are negative, except as otherwise noted.      Objective    /72   Pulse 66   Temp 97.8  F (36.6  C) (Temporal)   Resp 19   Ht 1.6 m (5' 3\")  "  Wt 59 kg (130 lb)   SpO2 94%   BMI 23.03 kg/m    Body mass index is 23.03 kg/m .  Physical Exam   GENERAL: healthy, alert and no distress  EYES: Eyes grossly normal to inspection, PERRL and conjunctivae and sclerae normal  NECK: no adenopathy, no asymmetry, masses, or scars and thyroid normal to palpation  RESP: lungs clear to auscultation - no rales, rhonchi or wheezes  CV: regular rate and rhythm, normal S1 S2, no S3 or S4, no murmur, click or rub, no peripheral edema and peripheral pulses strong  ABDOMEN: soft, nontender, no hepatosplenomegaly, no masses and bowel sounds normal  MS: no gross musculoskeletal defects noted, no edema  NEURO: Normal strength and tone, mentation intact and speech normal  PSYCH: mentation appears normal, affect normal/bright

## 2023-10-03 ENCOUNTER — PATIENT OUTREACH (OUTPATIENT)
Dept: CARE COORDINATION | Facility: CLINIC | Age: 53
End: 2023-10-03
Payer: COMMERCIAL

## 2023-10-12 ENCOUNTER — HOSPITAL ENCOUNTER (OUTPATIENT)
Dept: MAMMOGRAPHY | Facility: CLINIC | Age: 53
Discharge: HOME | End: 2023-10-12
Attending: NURSE PRACTITIONER | Admitting: NURSE PRACTITIONER
Payer: COMMERCIAL

## 2023-10-12 DIAGNOSIS — Z12.31 VISIT FOR SCREENING MAMMOGRAM: ICD-10-CM

## 2023-10-12 PROCEDURE — 77067 SCR MAMMO BI INCL CAD: CPT

## 2023-11-03 ENCOUNTER — PATIENT OUTREACH (OUTPATIENT)
Dept: CARE COORDINATION | Facility: CLINIC | Age: 53
End: 2023-11-03
Payer: COMMERCIAL

## 2023-11-17 ENCOUNTER — PATIENT OUTREACH (OUTPATIENT)
Dept: CARE COORDINATION | Facility: CLINIC | Age: 53
End: 2023-11-17
Payer: COMMERCIAL

## 2024-03-02 ENCOUNTER — HEALTH MAINTENANCE LETTER (OUTPATIENT)
Age: 54
End: 2024-03-02

## 2024-05-30 ENCOUNTER — PATIENT OUTREACH (OUTPATIENT)
Dept: CARE COORDINATION | Facility: CLINIC | Age: 54
End: 2024-05-30
Payer: COMMERCIAL

## 2024-09-12 ENCOUNTER — PATIENT OUTREACH (OUTPATIENT)
Dept: CARE COORDINATION | Facility: CLINIC | Age: 54
End: 2024-09-12
Payer: COMMERCIAL

## 2024-10-10 ENCOUNTER — PATIENT OUTREACH (OUTPATIENT)
Dept: CARE COORDINATION | Facility: CLINIC | Age: 54
End: 2024-10-10
Payer: COMMERCIAL

## 2024-10-14 ENCOUNTER — OFFICE VISIT (OUTPATIENT)
Dept: FAMILY MEDICINE | Facility: CLINIC | Age: 54
End: 2024-10-14
Payer: COMMERCIAL

## 2024-10-14 ENCOUNTER — PATIENT OUTREACH (OUTPATIENT)
Dept: ONCOLOGY | Facility: CLINIC | Age: 54
End: 2024-10-14

## 2024-10-14 VITALS
TEMPERATURE: 97.9 F | WEIGHT: 149 LBS | SYSTOLIC BLOOD PRESSURE: 112 MMHG | BODY MASS INDEX: 26.4 KG/M2 | HEIGHT: 63 IN | HEART RATE: 71 BPM | OXYGEN SATURATION: 96 % | DIASTOLIC BLOOD PRESSURE: 80 MMHG

## 2024-10-14 DIAGNOSIS — R21 RASH: ICD-10-CM

## 2024-10-14 DIAGNOSIS — Z12.31 VISIT FOR SCREENING MAMMOGRAM: ICD-10-CM

## 2024-10-14 DIAGNOSIS — Z80.49 FAMILY HISTORY OF CERVICAL CANCER: ICD-10-CM

## 2024-10-14 DIAGNOSIS — Z13.220 LIPID SCREENING: ICD-10-CM

## 2024-10-14 DIAGNOSIS — Z80.0 FAMILY HISTORY OF COLON CANCER: ICD-10-CM

## 2024-10-14 DIAGNOSIS — Z00.00 VISIT FOR PREVENTIVE HEALTH EXAMINATION: Primary | ICD-10-CM

## 2024-10-14 DIAGNOSIS — H91.93 HEARING DIFFICULTY OF BOTH EARS: ICD-10-CM

## 2024-10-14 DIAGNOSIS — Z80.3 FAMILY HISTORY OF MALIGNANT NEOPLASM OF BREAST: ICD-10-CM

## 2024-10-14 DIAGNOSIS — R63.5 WEIGHT GAIN: ICD-10-CM

## 2024-10-14 PROCEDURE — 91320 SARSCV2 VAC 30MCG TRS-SUC IM: CPT | Performed by: NURSE PRACTITIONER

## 2024-10-14 PROCEDURE — 90471 IMMUNIZATION ADMIN: CPT | Performed by: NURSE PRACTITIONER

## 2024-10-14 PROCEDURE — 99213 OFFICE O/P EST LOW 20 MIN: CPT | Mod: 25 | Performed by: NURSE PRACTITIONER

## 2024-10-14 PROCEDURE — 99396 PREV VISIT EST AGE 40-64: CPT | Mod: 57 | Performed by: NURSE PRACTITIONER

## 2024-10-14 PROCEDURE — 90480 ADMN SARSCOV2 VAC 1/ONLY CMP: CPT | Performed by: NURSE PRACTITIONER

## 2024-10-14 PROCEDURE — 90673 RIV3 VACCINE NO PRESERV IM: CPT | Performed by: NURSE PRACTITIONER

## 2024-10-14 RX ORDER — NALTREXONE HYDROCHLORIDE 50 MG/1
TABLET, FILM COATED ORAL
Qty: 30 TABLET | Refills: 1 | Status: SHIPPED | OUTPATIENT
Start: 2024-10-14

## 2024-10-14 RX ORDER — MOMETASONE FUROATE 1 MG/G
CREAM TOPICAL DAILY PRN
Qty: 15 G | Refills: 1 | Status: SHIPPED | OUTPATIENT
Start: 2024-10-14

## 2024-10-14 SDOH — HEALTH STABILITY: PHYSICAL HEALTH: ON AVERAGE, HOW MANY DAYS PER WEEK DO YOU ENGAGE IN MODERATE TO STRENUOUS EXERCISE (LIKE A BRISK WALK)?: 2 DAYS

## 2024-10-14 ASSESSMENT — SOCIAL DETERMINANTS OF HEALTH (SDOH): HOW OFTEN DO YOU GET TOGETHER WITH FRIENDS OR RELATIVES?: ONCE A WEEK

## 2024-10-14 NOTE — PROGRESS NOTES
"Preventive Care Visit  [facility]  FLO Lazo CNP, Family Medicine  Oct 14, 2024      Assessment & Plan     Visit for screening mammogram  **  - MA Screening Bilateral w/ Jesus    Rash  **intermittent itchy skin on anterior calf. Refilled given.   - mometasone (ELOCON) 0.1 % external cream  Dispense: 15 g; Refill: 1    Hearing difficulty of both ears  **  - Adult Audiology  Referral    Weight gain  **  - naltrexone (DEPADE/REVIA) 50 MG tablet  Dispense: 30 tablet; Refill: 1    Lipid screening  **  - Lipid panel reflex to direct LDL Non-fasting    Family history of colon cancer  **  - Adult Genetics & Metabolism  Referral    Family history of malignant neoplasm of breast  **  - Adult Genetics & Metabolism  Referral    Family history of cervical cancer  **  - Adult Genetics & Metabolism  Referral    Visit for preventive health examination  **    - we discussed benefits of genetic testing. Ordered.   - will get lipids done at next lab draw.   - spent time discussing weight loss, lifestyle changes, perimenopause, etc. Can try Naltrexone. VV in two months. Can increase to 25 mg BID if needed.       Patient has been advised of split billing requirements and indicates understanding: Yes        BMI  Estimated body mass index is 26.39 kg/m  as calculated from the following:    Height as of this encounter: 1.6 m (5' 3\").    Weight as of this encounter: 67.6 kg (149 lb).   Weight management plan: Discussed healthy diet and exercise guidelines    Counseling  Appropriate preventive services were addressed with this patient via screening, questionnaire, or discussion as appropriate for fall prevention, nutrition, physical activity, Tobacco-use cessation, social engagement, weight loss and cognition.  Checklist reviewing preventive services available has been given to the patient.  Reviewed patient's diet, addressing concerns and/or questions.   She is at risk " for lack of exercise and has been provided with information to increase physical activity for the benefit of her well-being.   She is at risk for psychosocial distress and has been provided with information to reduce risk.           Angie Aldridge is a 54 year old, presenting for the following:  Physical    Last period 9-. Hot flashes intermittently, more in summer, not too bad now. Does not like her weight. Has gained more this past year. Intermittent fasting. Feels like she trys to eat helathy. Has cravings. No added stress. Sleeping okay. No depression. Chronic constipation. Does not work since she started having kids. Had four kids.   - crowded rooms hearing is bad, clarity is bad. Constant ringing.        10/14/2024     2:12 PM   Additional Questions   Roomed by Cristal CARLOS       Health Care Directive  Patient does not have a Health Care Directive or Living Will: Discussed advance care planning with patient; information given to patient to review.    HPI              10/14/2024   General Health   How would you rate your overall physical health? Good   Feel stress (tense, anxious, or unable to sleep) Only a little      (!) STRESS CONCERN      10/14/2024   Nutrition   Three or more servings of calcium each day? (!) NO   Diet: Regular (no restrictions)    Carbohydrate counting    Breakfast skipped   How many servings of fruit and vegetables per day? (!) 2-3   How many sweetened beverages each day? 0-1       Multiple values from one day are sorted in reverse-chronological order         10/14/2024   Exercise   Days per week of moderate/strenous exercise 2 days      (!) EXERCISE CONCERN      10/14/2024   Social Factors   Frequency of gathering with friends or relatives Once a week   Worry food won't last until get money to buy more No   Food not last or not have enough money for food? No   Do you have housing? (Housing is defined as stable permanent housing and does not include staying ouside in a car, in a  tent, in an abandoned building, in an overnight shelter, or couch-surfing.) Yes   Are you worried about losing your housing? No   Lack of transportation? No   Unable to get utilities (heat,electricity)? No            10/14/2024   Fall Risk   Fallen 2 or more times in the past year? No   Trouble with walking or balance? Yes              10/14/2024   Dental   Dentist two times every year? Yes            10/14/2024   TB Screening   Were you born outside of the [place]? No            Today's PHQ-2 Score:       10/14/2024     2:13 PM   PHQ-2 ( 1999 Pfizer)   Q1: Little interest or pleasure in doing things 1   Q2: Feeling down, depressed or hopeless 1   PHQ-2 Score 2   Q1: Little interest or pleasure in doing things Several days   Q2: Feeling down, depressed or hopeless Several days   PHQ-2 Score 2           10/14/2024   Substance Use   Alcohol more than 3/day or more than 7/wk No   Do you use any other substances recreationally? No        Social History     Tobacco Use    Smoking status: Never    Smokeless tobacco: Never   Vaping Use    Vaping status: Never Used   Substance Use Topics    Alcohol use: No    Drug use: No           10/12/2023   LAST FHS-7 RESULTS   1st degree relative breast or ovarian cancer Yes   Any relative bilateral breast cancer Unknown   Any male have breast cancer No   Any ONE woman have BOTH breast AND ovarian cancer No   Any woman with breast cancer before 50yrs Unknown   2 or more relatives with breast AND/OR ovarian cancer Yes   2 or more relatives with breast AND/OR bowel cancer Yes                     10/14/2024   One time HIV Screening   Previous HIV test? No          10/14/2024   STI Screening   New sexual partner(s) since last STI/HIV test? No        History of abnormal Pap smear: No - age 30- 64 PAP with HPV every 5 years recommended        Latest Ref Rng & Units 12/2/2022     8:05 AM 9/29/2015    10:06 AM   PAP / HPV   PAP  Negative for Intraepithelial Lesion or Malignancy (NILM)  Negative for  "squamous intraepithelial lesion or malignancy  Electronically signed by Luanne Zhao CT (ASCP) on 10/12/2015 at 12:33 PM      HPV 16 DNA Negative Negative     HPV 18 DNA Negative Negative     Other HR HPV Negative Negative       ASCVD Risk   The ASCVD Risk score (Jessica FLORENCE, et al., 2019) failed to calculate for the following reasons:    Cannot find a previous HDL lab    Cannot find a previous total cholesterol lab           Reviewed and updated as needed this visit by Provider                    History reviewed. No pertinent past medical history.  Past Surgical History:   Procedure Laterality Date    BUNIONECTOMY       Labs reviewed in EPIC      Review of Systems  Constitutional, HEENT, cardiovascular, pulmonary, gi and gu systems are negative, except as otherwise noted.     Objective    Exam  /80   Pulse 71   Temp 97.9  F (36.6  C)   Ht 1.6 m (5' 3\")   Wt 67.6 kg (149 lb)   SpO2 96%   BMI 26.39 kg/m     Estimated body mass index is 26.39 kg/m  as calculated from the following:    Height as of this encounter: 1.6 m (5' 3\").    Weight as of this encounter: 67.6 kg (149 lb).    Physical Exam  GENERAL: alert and no distress  EYES: Eyes grossly normal to inspection, PERRL and conjunctivae and sclerae normal  HENT: ear canals and TM's normal, nose and mouth without ulcers or lesions  NECK: no adenopathy, no asymmetry, masses, or scars  RESP: lungs clear to auscultation - no rales, rhonchi or wheezes  CV: regular rate and rhythm, normal S1 S2, no S3 or S4, no murmur, click or rub, no peripheral edema  ABDOMEN: soft, nontender, no hepatosplenomegaly, no masses and bowel sounds normal  MS: no gross musculoskeletal defects noted, no edema  SKIN: no suspicious lesions or rashes  NEURO: Normal strength and tone, mentation intact and speech normal  PSYCH: mentation appears normal, affect normal/bright  LYMPH: no cervical, supraclavicular, axillary, or inguinal adenopathy        Signed " Electronically by: FLO Lazo CNP

## 2024-10-14 NOTE — PATIENT INSTRUCTIONS
"Licha corine menapause Reset. And fasting like a girl.     Christianne Ruanos exercise. Lifting heavy weights.       Consider keeping a food diary (i.e. My Fitness Pal, Lose It, or other food tracker). Try to use it 24 hours, every day for at least one week. This will help with accountability.  Start Weighing yourself every day, this will aide in keeping you accountable. If it makes you anxious, you can skip this step.      Nutrition Goals  1) Follow 6216-8293 calorie/day plan; can use Motista norris to help with diary.               -www.eatthismuch.com     2) 9\" Plate method (1/2 plate non-starchy vegetables/fruit, 1/4 plate lean protein, 1/4 plate whole grain starch - no more than 1/2 cup carb/meal).     3).Eat 3 meals a day (not 2, not 5) Chew your food well/SLOW down    4) Eat your protein first, this will aide in filling you up. Eat Wheat, not white (bread, pasta, crackers, yash, bagels, tortillas, rice)  ---Can aim for 80 grams of lean protein daily should be effective for you and allow you to have good control over appetite if you start getting a protein rich meal with 25-30 grams of protein every 4.5-6 hours through the day. This protein anchor to the meal should be bulked up with some good vegetables/greens for the fiber/crunch/chew and benefit on your blood pressure.  Get the protein portion of your meal at the beginning of the meal as your appetite, especially if you are on appetite suppressant/diabetes medication. Appetite suppressants can cause fullness and we want to make sure you get at least 25 grams of protein at each of your 3 meals daily to support good metabolism and lean muscle mass.      5). Be a water drinker/Minize liquid calories (no regular pop, no juice) skim or 1% milk OK  6) Sleep 7-8 hours each night. Address sleep if problematic  7) Stress management is important. Address if problematic  8) Limit restaurant, cafeteria, take out, drive through to 2 times per week or less  9) Minimize caffeine, " alcohol, and night-time snacking    -Follow up with the dietitian    **Some lean proteins: chicken, turkey, tuna, salmon, crab, fish, shrimp, scallops, lobster, lean cuts of beef and pork, luncheon meats, veggie burgers, beans (black, lima, garbanzo, viveros, kidney, refried), chile, cottage cheese, string cheese, other cheese, eggs, tofu, peanut butter, nuts, vegan crumbles, greek yogurt      --- Continue physical activity; can start with 10 minutes per day like going for a walk after dinner or at your lunchtime. Goal is 8000 steps daily. For the Muscle: maintain your muscle mass (strength training 2X/wk)    Meal prep tips: https://www.[employer]/healthyeats/healthy-tips/2019/10/meal-prep-tips-hacks-experts    Avoid snacking as able. If snack is needed use lean protein and/or fruit/vegetable. Examples:              - 2 cup popcorn              - 1 cup mixed berries              - 15 almonds, walnuts, cashews              - carrot/celery sticks and 2 tbsp low-fat ranch              - 1 hard boiled egg              - Part-skim mozzarella cheese stick              - Low-fat, low-sugar greek yogurt with 1/2 cup berries              - Med apple or pear              - sliced bell peppers with 1/2 cup salsa              - 1/2 cup roasted chickpeas              - sliced cucumbers with vinegar     100 calorie sweets: Smart Sweets, Fiber One desserts, Fit and Active 100 calorie snack sweets at [employer]; [employer] 100 calorie pre-portioned cookies, sugar-free pudding, sugar-free jello.     Snack Recipes:  - Banana and creamy PB dip (https://www.diabetesfoodhub.org/recipes/sweet-peanut-buttery-dip.html?home-category_id=23)  - Roasted chickpeas (https://www.diabetesfoodhub.org/recipes/roasted-and-spiced-chickpeas.html?home-category_id=23)  - Lemon Raspberry urban seed pudding (https://www.diabetesfoodhub.org/recipes/lemon-raspberry- urban-seed-pudding.html?home-category_id=23)  - Black bean hummus with carrot and celery sticks  "(https://www.diabetesfoodhub.org/recipes/black-bean-hummus.html?home-category_id=23)  - Greek yogurt chocolate mouse (https://www.diabetesfoodhub.org/recipes/greek-yogurt-chocolate-mousse.html?home-category_id=23)   - Broccoli Cheese Bites  (https://www.diabetesfoodhub.org/recipes/broccoli-cheese-bites.html?home-category_id=20)  - Chicken Satay with peanut sauce  (https://www.diabetesfoodeXIthera Pharmaceuticalsb.org/recipes/blueberry-almond-chicken-salad-lettuce-wraps.html?home-category_id=20)  - Deviled Eggs  (https://www.diabetesfoodeXIthera Pharmaceuticalsb.org/recipes/devilled-eggs.html?home-category_id=20)     Healthy Recipe Resources:  Books:  \"The Volumetrics Eating Plan\" by Rebecca Garnett, Ph.D.  \"Cooking that Counts\" by editors of ReliOn  \"Calorie Smart Meals\" cookbook by [employer] (200-500 calorie meals)     Websites:  www.[employer]  www.Rekoo.org  https://www.diabetesfoodeXIthera Pharmaceuticalsb.org/all-recipes.html  https://www.ip.access.[employer]/  Https://www.KIWATCHmyplate.gov/myplatekitchen  https://snaped.fns.usda.gov/recipes-menus   https://www.Airstone.[employer]/gallery/8603671/dietitian-budget-high-protein-dinner-recipes/  https://www.Ratio.[employer]/recipes/84/healthy-recipes/         Cultural Cuisines:  https://www.Airstone.com/recipes/88174/cuisines-regions//  https://www.[employer].org/knowledge-center/recipes/  https://Neovacs/recipe-index/     Apps:  TranSiC norris (or website, mealime.com)   [place]         The Plate Method  Http://www.[employer]/632865.pdf     Protein Sources   http://[employer]/732190.pdf      Carbohydrates  http://fvfiles.com/137939.pdf      Mindful Eating  http://[employer]/881054.pdf      Summary of Volumetrics Eating Plan  http://fvfiles.com/910070.pdf     "

## 2024-10-14 NOTE — PROGRESS NOTES
Writer received referral to Cancer Risk Management/Genetic Counseling.    Referred for:    family HO breast, ovarian, and colon cancer.    Referred By    Provider Department Location Phone   Maddy Carlos APRN CNP [facility] [phone]       Referral reviewed for appropriate plan, and sent to New Patient Scheduling ([phone]) for completion.    Elayne Bagley, RN, BSN  Oncology New Patient Nurse Navigator   [facility]

## 2024-10-15 ENCOUNTER — PATIENT OUTREACH (OUTPATIENT)
Dept: CARE COORDINATION | Facility: CLINIC | Age: 54
End: 2024-10-15
Payer: COMMERCIAL

## 2024-10-16 ENCOUNTER — HOSPITAL ENCOUNTER (OUTPATIENT)
Dept: MAMMOGRAPHY | Facility: CLINIC | Age: 54
Discharge: HOME | End: 2024-10-16
Attending: NURSE PRACTITIONER | Admitting: NURSE PRACTITIONER
Payer: COMMERCIAL

## 2024-10-16 DIAGNOSIS — Z12.31 VISIT FOR SCREENING MAMMOGRAM: ICD-10-CM

## 2024-10-16 PROCEDURE — 77063 BREAST TOMOSYNTHESIS BI: CPT

## 2024-10-21 ENCOUNTER — OFFICE VISIT (OUTPATIENT)
Dept: AUDIOLOGY | Facility: CLINIC | Age: 54
End: 2024-10-21
Attending: NURSE PRACTITIONER
Payer: COMMERCIAL

## 2024-10-21 DIAGNOSIS — R42 DIZZINESS AND GIDDINESS: ICD-10-CM

## 2024-10-21 DIAGNOSIS — H91.93 HEARING DIFFICULTY OF BOTH EARS: ICD-10-CM

## 2024-10-21 DIAGNOSIS — H93.13 TINNITUS OF BOTH EARS: Primary | ICD-10-CM

## 2024-10-21 PROCEDURE — 92557 COMPREHENSIVE HEARING TEST: CPT | Performed by: AUDIOLOGIST

## 2024-10-21 PROCEDURE — 92550 TYMPANOMETRY & REFLEX THRESH: CPT | Mod: 52 | Performed by: AUDIOLOGIST

## 2024-10-21 NOTE — PROGRESS NOTES
AUDIOLOGY REPORT    SUBJECTIVE:  Oly Ch is a 54 year old female who was seen in the Audiology Clinic at the [facility] for audiologic evaluation, referred by FLO Larry CNP. The patient report decreased hearing ability when family are at an increased distance from the patient or walking away from her as they speak. She indicated she is aware they are speaking, but the speech clarity isn't always there. She described what may be positional dizziness, which occurs when she looks up, is supine, or getting out of a car. The dizzy sensation lasts only a few seconds each time it occurs. She endorsed bilateral, constant tinnitus, which is not debilitating or pulsatile. She denied otalgia, otorrhea, recent illness, aural fullness, or history of noise exposure.     OBJECTIVE:  Abuse Screening:  Do you feel unsafe at home or work/school? No  Do you feel threatened by someone? No  Does anyone try to keep you from having contact with others, or doing things outside of your home? No  Physical signs of abuse present? No     Fall Risk Screen:  1. Have you fallen two or more times in the past year? No  2. Have you fallen and had an injury in the past year? No    Otoscopic exam indicates ears are clear of cerumen, bilaterally.     Pure Tone Thresholds assessed using conventional audiometry with good  reliability from 250-8000 Hz bilaterally using insert earphones and circumaural headphones.     RIGHT:  normal hearing sensitivity for 250-6000 Hz, sloping to mild, likely sensorineural hearing loss at 8000 Hz    LEFT:   normal/borderline hearing sensitivity for 250-8000 Hz    Tympanogram:    RIGHT: normal eardrum mobility    LEFT:   normal eardrum mobility    Reflexes for 1000 Hz (reported by stimulus ear):  RIGHT: Ipsilateral is present at normal levels  RIGHT: Contralateral could not be assessed due to equipment issues  LEFT:   Ipsilateral is present at normal levels  LEFT:    Contralateral could not be assessed due to equipment issues      Speech Reception Threshold:    RIGHT: 10 dB HL    LEFT:   15 dB HL  Word Recognition Score:     RIGHT: 100% at 50 dB HL using NU-6 recorded word list.    LEFT:   100% at 50 dB HL using NU-6 recorded word list.      ASSESSMENT:     ICD-10-CM    1. Tinnitus of both ears  H93.13       2. Hearing difficulty of both ears  H91.93 Adult Audiology  Referral      3. Dizziness and giddiness  R42           Today s results were discussed with the patient in detail. Appropriate communication strategies were discussed at length, as were reasonable expectations regarding hearing at a distance, in noisy environments, or when attention is diverted elsewhere. Common tinnitus triggers and management strategies were also discussed.    PLAN:  Ms. Ch should return for hearing evaluation per medical management or patient concern. She may wish to follow up with primary care/physical therapy for possible positional dizziness. Wear hearing protection consistently in noise to preserve current hearing sensitivity and to minimize the effects of noise on tinnitus. Ms. Ch is not yet an ideal amplification candidate for either ear. Ms. Ch expressed verbal understanding of this information and plan. Please call this clinic with questions regarding these results or recommendations.        Israel Everett, [facility]  [place] Licensed Audiologist [phone]

## 2025-01-22 DIAGNOSIS — R63.5 WEIGHT GAIN: ICD-10-CM

## 2025-01-22 RX ORDER — NALTREXONE HYDROCHLORIDE 50 MG/1
TABLET, FILM COATED ORAL
Qty: 30 TABLET | Refills: 1 | Status: SHIPPED | OUTPATIENT
Start: 2025-01-22

## 2025-06-24 ENCOUNTER — OFFICE VISIT (OUTPATIENT)
Dept: FAMILY MEDICINE | Facility: CLINIC | Age: 55
End: 2025-06-24
Payer: COMMERCIAL

## 2025-06-24 VITALS
HEIGHT: 63 IN | HEART RATE: 67 BPM | OXYGEN SATURATION: 97 % | BODY MASS INDEX: 26.13 KG/M2 | WEIGHT: 147.5 LBS | DIASTOLIC BLOOD PRESSURE: 89 MMHG | RESPIRATION RATE: 16 BRPM | SYSTOLIC BLOOD PRESSURE: 139 MMHG

## 2025-06-24 DIAGNOSIS — Z80.0 FAMILY HISTORY OF COLON CANCER: ICD-10-CM

## 2025-06-24 DIAGNOSIS — Z80.3 FAMILY HISTORY OF MALIGNANT NEOPLASM OF BREAST: ICD-10-CM

## 2025-06-24 DIAGNOSIS — Z80.49 FAMILY HISTORY OF CERVICAL CANCER: ICD-10-CM

## 2025-06-24 DIAGNOSIS — N95.1 MENOPAUSAL SYNDROME (HOT FLASHES): ICD-10-CM

## 2025-06-24 DIAGNOSIS — R63.5 WEIGHT GAIN: ICD-10-CM

## 2025-06-24 DIAGNOSIS — B35.1 TOENAIL FUNGUS: Primary | ICD-10-CM

## 2025-06-24 PROCEDURE — 3079F DIAST BP 80-89 MM HG: CPT | Performed by: NURSE PRACTITIONER

## 2025-06-24 PROCEDURE — 99213 OFFICE O/P EST LOW 20 MIN: CPT | Performed by: NURSE PRACTITIONER

## 2025-06-24 PROCEDURE — 3075F SYST BP GE 130 - 139MM HG: CPT | Performed by: NURSE PRACTITIONER

## 2025-06-24 RX ORDER — CICLOPIROX 80 MG/ML
SOLUTION TOPICAL
Qty: 6.6 ML | Refills: 11 | Status: SHIPPED | OUTPATIENT
Start: 2025-06-24

## 2025-06-24 NOTE — PROGRESS NOTES
"  Assessment & Plan     Toenail fungus:  - Possible toenail fungus, but differential diagnosis includes genetic condition related to nail thickening called Pachyonychia congenita. Her mother had toenail problems.  - Prescribe nail lacquer for toenail fungus. Consider genetic testing for nail condition.  - Risks and side effects: Oral antifungal medications affect liver function and require liver enzyme monitoring; alcohol consumption is contraindicated.    Family history of malignant neoplasm of breast:  - Family history of breast cancer noted.  - Genetic counseling recommended. Referral for genetic testing provided.    Family history of cervical cancer:  - Family history of cervical cancer noted.  - Genetic counseling recommended. Referral for genetic testing provided.    Family history of colon cancer:  - Family history of colon cancer noted.  - Genetic counseling recommended. Referral for genetic testing provided.    Menopausal syndrome (hot flashes):  - Hot flashes associated with menopausal syndrome.  - Discussed options including estrogen patches, progesterone, low-dose Paxil, and gabapentin. Patient declined treatment at this time.  - Risks and side effects: Withdrawal symptoms possible with Paxil.    Consent was obtained from the patient to use an AI documentation tool in the creation of this note.    BMI  Estimated body mass index is 26.13 kg/m  as calculated from the following:    Height as of this encounter: 1.6 m (5' 3\").    Weight as of this encounter: 66.9 kg (147 lb 8 oz).     Follow-up       Subjective   Oly is a 54 year old, presenting for the following health issues:  Nail Problem (Bilateral Great Toe Nail Thickening)      6/24/2025     9:47 AM   Additional Questions   Roomed by Azul DEVI MA     History of Present Illness-  Oly Ch, 54 years old, female    - Postmenopausal since January, with irregular periods prior to cessation  - Experiencing hot flashes, described as claustrophobic, " "started last summer, abated for a while, and then returned  - Toenail thickening and minor blackness on one toe, difficulty trimming, possibly related to trauma or fungal infection. Mother had narrow and curling of the toenails.   - No weight loss despite efforts, down 2 lbs since October  - History of perimenopause affecting mood, possibly improved with Wellbutrin and PRN naltrexone    History of Present Illness       Reason for visit:  Toenails She is missing 4 dose(s) of medications per week.  She is not taking prescribed medications regularly due to other.                      Objective    /89   Pulse 67   Resp 16   Ht 1.6 m (5' 3\")   Wt 66.9 kg (147 lb 8 oz)   SpO2 97%   BMI 26.13 kg/m    Body mass index is 26.13 kg/m .  Physical Exam   GENERAL: alert and no distress  - left second toe; nail thickening noted, greyish and narrowing appearing, not yellow.  - right second toe, intact, no thickening noted            Signed Electronically by: FLO Lazo CNP    "